# Patient Record
Sex: MALE | Race: WHITE | NOT HISPANIC OR LATINO | Employment: FULL TIME | URBAN - METROPOLITAN AREA
[De-identification: names, ages, dates, MRNs, and addresses within clinical notes are randomized per-mention and may not be internally consistent; named-entity substitution may affect disease eponyms.]

---

## 2022-07-03 RX ORDER — LISDEXAMFETAMINE DIMESYLATE 40 MG/1
CAPSULE ORAL
COMMUNITY
Start: 2021-09-28

## 2022-07-03 RX ORDER — CITALOPRAM 20 MG/1
TABLET ORAL
COMMUNITY

## 2022-12-27 ENCOUNTER — TELEPHONE (OUTPATIENT)
Dept: HEALTH INFORMATION MANAGEMENT | Facility: OTHER | Age: 22
End: 2022-12-27
Payer: COMMERCIAL

## 2023-01-30 ENCOUNTER — TELEPHONE (OUTPATIENT)
Dept: CARDIOLOGY | Facility: MEDICAL CENTER | Age: 23
End: 2023-01-30
Payer: COMMERCIAL

## 2023-01-30 NOTE — TELEPHONE ENCOUNTER
Spoke to patient in regards to obtaining records for NP appointment with Dr. Rodrigez . Per patient has never been treated by a previous cardiologist. Confirmed all recent notes, labs, and cardiac imaging are in Epic. Confirmed with patient appointment time, location, and date. 2/8/23.

## 2023-02-07 NOTE — PROGRESS NOTES
CARDIOLOGY CONSULTATION NOTE      Date of Visit: 2/8/2023    Referring Provider: Matt Post M.D.    Patient Name: Joni Molina    YOB: 2000  MRN: [id number]     Reason for Visit:   Lightheadedness, syncope     Patient Story:   Joni Molina is a 22 year-old gentleman with a past medical history of mood disorders and ADHD.  Briefly, he reported worsening episodes of dizziness and syncope to his PCP.  He was referred to Cardiology for further evaluation.    He presents today for consultation.  He notes that for approximately the past year he has had significant episodes of lightheadedness.  These are mostly brought on by positional changes, although sometimes he will notice lightheadedness when he stretches his back for a long time.  He does not specifically notice a racing heart rate associated with his lightheadedness.  He has had severe enough lightheadedness that he has passed out approximately 3 times in the past year.  Otherwise, he denies any known history of cardiovascular disease and denies any significant family history of cardiovascular disease.  He was previously on medications for mood disorders and ADHD, but is no longer taking any medications.  He works as a  at the [employer].  He is a current smoker.  He does not exercise regularly.    Orthostatic vital signs were checked in clinic today.  His blood pressure seated was 109/71 mmHg and heart rate was 80 bpm.  Upon standing, he experienced lightheadedness and approximately 1 minute after standing blood pressure was 116/66 mmHg and heart rate was 140 bpm.     Medications and Allergies:     No current outpatient medications on file.     No current facility-administered medications for this visit.     No Known Allergies     Medical Decision Making:   # Lightheadedness, suspected POTS: His symptoms are fairly classic for POTS and he had an approximately 60 bpm increase in heart rate after going from laying down to  "standing without a concomitant fall and blood pressure, which meets diagnostic criteria for POTS.  Although POTS is strongly suspected, we will evaluate for more serious underlying cardiac etiologies with cardiac monitoring and an echocardiogram.  Otherwise, I discussed in detail the natural history of POTS as well as initial conservative management strategies including adequate fluid and salt intake, avoidance of prolonged periods of lying down, and the critical importance of maintaining activity levels.  -2-week cardiac monitor  -Echocardiogram  -Follow-up in 1 month to review testing and symptoms.  If his symptoms progress could consider starting midodrine or referring to [facility].    # Borderline ECG: His ECG is most likely within normal limits for his age, although some features can be seen with right ventricular hypertrophy.  Echocardiogram as above to evaluate.    # Tobacco abuse: We discussed the importance of complete cessation.    Follow Up  1 month     Cardiac Studies and Procedures:   Echocardiography  Pending    Electrophysiology  ECG (2/8/2023)-independently interpreted  Normal sinus rhythm, within normal limits     Vital Signs:   BP (!) 88/68 (BP Location: Left arm, Patient Position: Sitting)   Pulse (!) 116   Resp 16   Ht 1.905 m (6' 3\")   Wt 56.7 kg (125 lb)   SpO2 96%    BP Readings from Last 4 Encounters:   02/08/23 (!) 88/68     Wt Readings from Last 4 Encounters:   02/08/23 56.7 kg (125 lb)     Body mass index is 15.62 kg/m².     Laboratories:   [facility] (11/11/2022)  WBC 5.7  Hgb 17.5  Plt 166  Glucose 96  BUN 13  Creatinine 0.7  Na 137  K4.6  Total cholesterol 114  TG 64  HDL 52  LDL 48     Physical Examination:   General: Well-appearing, thin, tall, acute distress  Eyes: Extraocular movements intact, anicteric  Ears: No ear lobe crease  Neck: Full range of motion, no jugular venous distension  Pulmonary: Normal respiratory effort, no " distress  Cardiovascular: Regular rate, regular rhythm  Gastrointestinal: Thin  Extremities: Moves all extremities normally, no gross lower extremity edema  Neurological: Alert and oriented, no gross focal motor deficits  Psychiatric: Normal affect, normal judgment     Past History:   Past Medical History  The patient's past medical history was reviewed.  See HPI and self-reported patient medical history form for pertinent medical history to consultation.    Past Social History  The patient's social history was reviewed.  See HPI self-reported patient medical history form for pertinent social history to consultation.    Past Family History  The patient's family history was reviewed.  See [facility] self-reported patient medical history form for pertinent family history to consultation.    Review of Systems  A pertinent cardiac review of systems was performed and was otherwise unremarkable except as per HPI and self-reported patient medical history form.        Greg Rodrigez MD, [facility]  Interventional Cardiology  [facility]  [address] [address]  Phone: [phone]  Fax: [phone]

## 2023-02-08 ENCOUNTER — OFFICE VISIT (OUTPATIENT)
Dept: CARDIOLOGY | Facility: MEDICAL CENTER | Age: 23
End: 2023-02-08
Payer: COMMERCIAL

## 2023-02-08 VITALS
RESPIRATION RATE: 16 BRPM | DIASTOLIC BLOOD PRESSURE: 68 MMHG | OXYGEN SATURATION: 96 % | HEART RATE: 116 BPM | SYSTOLIC BLOOD PRESSURE: 88 MMHG | WEIGHT: 125 LBS | BODY MASS INDEX: 15.54 KG/M2 | HEIGHT: 75 IN

## 2023-02-08 DIAGNOSIS — Z72.0 TOBACCO ABUSE: ICD-10-CM

## 2023-02-08 DIAGNOSIS — R55 SYNCOPE AND COLLAPSE: ICD-10-CM

## 2023-02-08 DIAGNOSIS — G90.A POTS (POSTURAL ORTHOSTATIC TACHYCARDIA SYNDROME): ICD-10-CM

## 2023-02-08 LAB — EKG IMPRESSION: NORMAL

## 2023-02-08 PROCEDURE — 99204 OFFICE O/P NEW MOD 45 MIN: CPT | Performed by: INTERNAL MEDICINE

## 2023-02-08 PROCEDURE — 93000 ELECTROCARDIOGRAM COMPLETE: CPT | Performed by: INTERNAL MEDICINE

## 2023-02-08 NOTE — PATIENT INSTRUCTIONS
1. Try to drink at least 3 quarts of water daily  2. Recommend a salt intake of 8-12 g daily  3. Strongly recommend continuing to exercise as deconditioning typically worsens POTS symptoms. Start with easier exercises such as light walking, recumbent bike, seated rowing machine, or light swimming as well as light weight lifting and increase as tolerated. It is very important to continue to exercise despite some symptoms, but stop exercising if you feel like you will pass out.  4. Try to stay upright for most of the day and avoid lying in bed as much as you can tolerate.  5. Try wearing compression stockings to see if this improves your symptoms  8. We will place a referral to [facility] if your symptoms become severe

## 2023-02-13 ENCOUNTER — NON-PROVIDER VISIT (OUTPATIENT)
Dept: CARDIOLOGY | Facility: MEDICAL CENTER | Age: 23
End: 2023-02-13
Attending: INTERNAL MEDICINE
Payer: COMMERCIAL

## 2023-02-13 DIAGNOSIS — I49.3 PVC'S (PREMATURE VENTRICULAR CONTRACTIONS): ICD-10-CM

## 2023-02-13 DIAGNOSIS — R00.0 SINUS TACHYCARDIA: ICD-10-CM

## 2023-02-13 DIAGNOSIS — I49.1 APC (ATRIAL PREMATURE CONTRACTIONS): ICD-10-CM

## 2023-02-13 DIAGNOSIS — R55 SYNCOPE AND COLLAPSE: ICD-10-CM

## 2023-02-13 NOTE — PROGRESS NOTES
Patient enrolled in the 14 day [facility] heart monitoring program, per Greg Rodrigez M.D.  >In clinic hook up, monitor S/N [id number].  >Baseline Transmitted.  >Pending EOS.

## 2023-03-08 ENCOUNTER — TELEPHONE (OUTPATIENT)
Dept: CARDIOLOGY | Facility: MEDICAL CENTER | Age: 23
End: 2023-03-08
Payer: COMMERCIAL

## 2023-03-09 PROCEDURE — 93268 ECG RECORD/REVIEW: CPT | Performed by: INTERNAL MEDICINE

## 2023-03-10 ENCOUNTER — TELEPHONE (OUTPATIENT)
Dept: CARDIOLOGY | Facility: MEDICAL CENTER | Age: 23
End: 2023-03-10
Payer: COMMERCIAL

## 2023-04-13 ENCOUNTER — OFFICE VISIT (OUTPATIENT)
Dept: URGENT CARE | Facility: PHYSICIAN GROUP | Age: 23
End: 2023-04-13
Payer: COMMERCIAL

## 2023-04-13 VITALS
BODY MASS INDEX: 15.9 KG/M2 | OXYGEN SATURATION: 96 % | RESPIRATION RATE: 16 BRPM | SYSTOLIC BLOOD PRESSURE: 114 MMHG | TEMPERATURE: 96.6 F | HEART RATE: 103 BPM | WEIGHT: 127.9 LBS | DIASTOLIC BLOOD PRESSURE: 70 MMHG | HEIGHT: 75 IN

## 2023-04-13 DIAGNOSIS — J06.9 VIRAL URI WITH COUGH: ICD-10-CM

## 2023-04-13 LAB
FLUAV RNA SPEC QL NAA+PROBE: NEGATIVE
FLUBV RNA SPEC QL NAA+PROBE: NEGATIVE
RSV RNA SPEC QL NAA+PROBE: NEGATIVE
SARS-COV-2 RNA RESP QL NAA+PROBE: NEGATIVE

## 2023-04-13 PROCEDURE — 99203 OFFICE O/P NEW LOW 30 MIN: CPT | Mod: CS | Performed by: REGISTERED NURSE

## 2023-04-13 PROCEDURE — 0241U POCT CEPHEID COV-2, FLU A/B, RSV - PCR: CPT | Performed by: REGISTERED NURSE

## 2023-04-13 ASSESSMENT — ENCOUNTER SYMPTOMS
FEVER: 0
VOMITING: 0
COUGH: 1
TINGLING: 0
DIARRHEA: 0
NECK PAIN: 0
EYE DISCHARGE: 0
SENSORY CHANGE: 0
DIZZINESS: 1
NAUSEA: 1
SHORTNESS OF BREATH: 0
HEADACHES: 1
SORE THROAT: 0
PALPITATIONS: 0
SPUTUM PRODUCTION: 0
WEAKNESS: 0
EYE PAIN: 0
CHILLS: 1
ABDOMINAL PAIN: 0

## 2023-04-13 NOTE — LETTER
April 13, 2023         Patient: Joni Molina   YOB: 2000   Date of Visit: 4/13/2023           To Whom it May Concern:    Joni Molina was seen in my clinic on 4/13/2023. He may return to work on 04/17/23.    If you have any questions or concerns, please don't hesitate to call.        Sincerely,           TRUDY Gomez  Electronically Signed

## 2023-04-13 NOTE — PROGRESS NOTES
Chief Complaint   Patient presents with    Flu Like Symptoms     Dizziness congestion, 3 days, chills, goosebumps, nausea, headache, loss of appitite, fatigue, OTC: dayquill, nyquill, tylenol     HPI:   Joni Molina is a 22 y.o. male who is presenting for 3 days of chills, fatigue, occasional dizziness, postnasal drainage, dry nonproductive cough, decreased appetite and nausea.  Has been resting and taking DayQuil and NyQuil which does help with symptoms.  Does report a history of POTS, but states this does not feel like that.  Is drinking plenty of water.    Patient Active Problem List    Diagnosis Date Noted    Syncope 02/08/2023    POTS (postural orthostatic tachycardia syndrome) 02/08/2023    Tobacco abuse 02/08/2023     No Known Allergies     Pertinent history: POTS    Social History     Tobacco Use    Smoking status: Every Day     Packs/day: 1.00     Types: Cigarettes    Smokeless tobacco: Never   Vaping Use    Vaping Use: Some days    Substances: Nicotine, Flavoring    Devices: [employer] tank   Substance Use Topics    Alcohol use: Not Currently    Drug use: Yes     Types: Marijuana, Oral     Comment: occ. gummie       Review of Systems   Constitutional:  Positive for chills and malaise/fatigue. Negative for fever.   HENT:  Positive for congestion. Negative for hearing loss and sore throat.    Eyes:  Negative for pain and discharge.   Respiratory:  Positive for cough. Negative for sputum production and shortness of breath.    Cardiovascular:  Negative for chest pain, palpitations and leg swelling.   Gastrointestinal:  Positive for nausea. Negative for abdominal pain, diarrhea and vomiting.   Musculoskeletal:  Negative for neck pain.   Skin:  Negative for rash.   Neurological:  Positive for dizziness and headaches. Negative for tingling, sensory change and weakness.      Vitals:    04/13/23 1531   BP: 114/70   Pulse: (!) 103   Resp: 16   Temp: 35.9 °C (96.6 °F)   SpO2: 96%     Physical Exam  Vitals and  nursing note reviewed.   Constitutional:       General: He is not in acute distress.     Appearance: Normal appearance. He is well-developed. He is not diaphoretic.   HENT:      Head: Normocephalic.      Right Ear: Hearing and tympanic membrane normal.      Left Ear: Hearing and tympanic membrane normal.      Nose:      Right Turbinates: Swollen.      Left Turbinates: Swollen.      Mouth/Throat:      Pharynx: Oropharynx is clear. Uvula midline. No posterior oropharyngeal erythema or uvula swelling.   Eyes:      General:         Right eye: No discharge.         Left eye: No discharge.      Conjunctiva/sclera: Conjunctivae normal.   Cardiovascular:      Rate and Rhythm: Normal rate and regular rhythm.      Pulses: Normal pulses.      Heart sounds: Normal heart sounds.   Pulmonary:      Effort: Pulmonary effort is normal. No respiratory distress.      Breath sounds: Normal breath sounds.   Abdominal:      General: Abdomen is flat. There is no distension.      Palpations: Abdomen is soft.      Tenderness: There is no abdominal tenderness. There is no right CVA tenderness, left CVA tenderness, guarding or rebound.   Musculoskeletal:      Cervical back: Normal range of motion and neck supple.   Skin:     General: Skin is warm and dry.      Capillary Refill: Capillary refill takes less than 2 seconds.   Neurological:      General: No focal deficit present.      Mental Status: He is alert and oriented to person, place, and time.      Cranial Nerves: Cranial nerves 2-12 are intact.      Sensory: Sensation is intact.      Motor: Motor function is intact.      Coordination: Coordination is intact. Finger-Nose-Finger Test normal.      Gait: Gait is intact.   Psychiatric:         Mood and Affect: Mood normal.     Assessment/Plan:  1. Viral URI with cough  POCT CEPHEID COV-2, FLU A/B, RSV - PCR         Joni babb 22-year-old male presenting with 3 days of viral URI symptoms.  His vital signs are within normal limits  with the exception of borderline elevated heart rate, patient states he typically runs a lot higher.  His exam is unremarkable, no adventitious heart or lung sounds.  Neurologically is intact.  Symptoms are responding to DayQuil/NyQuil.  Viral Cepheid testing negative.  Recommend adequate hydration, rest, deep breathing and coughing, ambulation as tolerated, OTC medications.     Return to clinic or go to ED if symptoms worsen or persist. Indications for ED discussed at length. Patient/Parent/Guardian voices understanding. Follow-up with your primary care provider in 3-5 days. Red flag symptoms discussed. All side effects of medication discussed including allergic response, GI upset, tendon injury, rash, sedation etc.    I personally reviewed prior external notes and test results pertinent to today's visit as well as additional imaging and testing completed in clinic today.     Please note that this dictation was created using voice recognition software. I have made every reasonable attempt to correct obvious errors, but I expect that there are errors of grammar and possibly content that I did not discover before finalizing the note.